# Patient Record
(demographics unavailable — no encounter records)

---

## 2024-12-03 NOTE — PHYSICAL EXAM
[de-identified] : <1  cm left thyroid nodule, well circumscribed and mobile [Laryngoscopy Performed] : laryngoscopy was performed, see procedure section for findings [Midline] : located in midline position [Normal] : orientation to person, place, and time: normal

## 2024-12-03 NOTE — HISTORY OF PRESENT ILLNESS
[de-identified] : prior evaluation of thyroid nodule. FNA showed AUS with negative thyroseq.  denies any symptoms related. recent sonogram stable to smaller nodule. no changes medically since last visit. I have reviewed all old and new data and available images.  Additional information was obtained from others present at the time of visit to ensure the completeness of the history

## 2024-12-03 NOTE — ASSESSMENT
[FreeTextEntry1] : will observe. bloods requested. sonogram next visit. to call next week for results. to return earlier if any change. patient has been given the opportunity to ask questions, and all of the patient's questions have been answered to their satisfaction
Rebecca Lynne(Attending)

## 2024-12-11 NOTE — IMAGING
[de-identified] : Left Hip Exam: Inspection: No swelling, no ecchymosis, no deformity. Palpation: No buttock, groin or greater trochanter tenderness to palpation; tender to palpation over groin Range of Motion: Full range of motion, pain with resisted hip flexion Strength: 5/5 hip flexion, extension abduction and adduction Special testing: Negative impingement; no groin pain with hip rotation Motor and sensory intact distally Vascular: +2 DP and PT pulses Gait: Non- antalgic gait  Right Knee Exam:  Inspection: No effusion, no warmth, normal Q angle Palpation: Anterior tenderness to palpation; +crepitus Range of motion: 0-140; anterior pain with flexion; tight hamstrings Strength: 5/5 quadriceps and hamstring strength Special testing: Negative Lachman, negative Layne, negative patella apprehension Neuro: Motor and sensory intact distally Gait: Non-antalgic [No bony abnormalities] : No bony abnormalities [Left] : left hip with pelvis [There are no fractures, subluxations or dislocations. No significant abnormalities are seen] : There are no fractures, subluxations or dislocations. No significant abnormalities are seen [Right] : right knee [All Views] : anteroposterior, lateral, skyline, and anteroposterior standing [Mild patellofemoral OA] : Mild patellofemoral OA

## 2024-12-11 NOTE — HISTORY OF PRESENT ILLNESS
[Gradual] : gradual [4] : 4 [0] : 0 [Frequent] : frequent [Stairs] : stairs [de-identified] : Patient is a 55 year old male complaining of right knee and left hip pain for a year with no injury.   [] : no [de-identified] : pcp [de-identified] : xray